# Patient Record
Sex: MALE | Race: WHITE | Employment: FULL TIME | URBAN - METROPOLITAN AREA
[De-identification: names, ages, dates, MRNs, and addresses within clinical notes are randomized per-mention and may not be internally consistent; named-entity substitution may affect disease eponyms.]

---
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
## 2024-01-02 ENCOUNTER — HOSPITAL ENCOUNTER (EMERGENCY)
Age: 24
Discharge: HOME | End: 2024-01-03
Payer: COMMERCIAL

## 2024-01-02 ENCOUNTER — APPOINTMENT (OUTPATIENT)
Dept: GENERAL RADIOLOGY | Age: 24
End: 2024-01-02
Payer: COMMERCIAL
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
## 2024-01-02 DIAGNOSIS — S51.011A ELBOW LACERATION, RIGHT, INITIAL ENCOUNTER: ICD-10-CM

## 2024-01-02 PROCEDURE — 73080 X-RAY EXAM OF ELBOW: CPT

## 2024-01-02 PROCEDURE — 12001 RPR S/N/AX/GEN/TRNK 2.5CM/<: CPT

## 2024-01-02 PROCEDURE — 99284 EMERGENCY DEPT VISIT MOD MDM: CPT

## 2024-01-03 VITALS
OXYGEN SATURATION: 95 % | HEART RATE: 83 BPM | RESPIRATION RATE: 16 BRPM | SYSTOLIC BLOOD PRESSURE: 128 MMHG | DIASTOLIC BLOOD PRESSURE: 93 MMHG | TEMPERATURE: 98.1 F

## 2024-01-03 PROCEDURE — 2500000003 HC RX 250 WO HCPCS: Performed by: PHYSICIAN ASSISTANT

## 2024-01-03 PROCEDURE — 6360000002 HC RX W HCPCS: Performed by: PHYSICIAN ASSISTANT

## 2024-01-03 PROCEDURE — 90471 IMMUNIZATION ADMIN: CPT | Performed by: PHYSICIAN ASSISTANT

## 2024-01-03 PROCEDURE — 90715 TDAP VACCINE 7 YRS/> IM: CPT | Performed by: PHYSICIAN ASSISTANT

## 2024-01-03 RX ORDER — LIDOCAINE HYDROCHLORIDE AND EPINEPHRINE 10; 10 MG/ML; UG/ML
20 INJECTION, SOLUTION INFILTRATION; PERINEURAL ONCE
Status: COMPLETED | OUTPATIENT
Start: 2024-01-03 | End: 2024-01-03

## 2024-01-03 RX ADMIN — TETANUS TOXOID, REDUCED DIPHTHERIA TOXOID AND ACELLULAR PERTUSSIS VACCINE, ADSORBED 0.5 ML: 5; 2.5; 8; 8; 2.5 SUSPENSION INTRAMUSCULAR at 00:51

## 2024-01-03 RX ADMIN — LIDOCAINE HYDROCHLORIDE,EPINEPHRINE BITARTRATE 20 ML: 10; .01 INJECTION, SOLUTION INFILTRATION; PERINEURAL at 00:51

## 2024-01-03 NOTE — DISCHARGE INSTRUCTIONS
1. Please call your primary care provider to schedule an appointment for reevaluation in 2-4 days for re-evaluation of your wound for any signs of infection or worsening. (If you are unable to follow up with your primary care provider, you can return to this emergency department.)  2. Unless we specified otherwise, your sutures should be removed from   joints 10-12 days; arms/legs 7-10 days.  You can do this at  occupational health, or your  family doctor, urgent care, or if needed return to this emergency department.  3. Return to Emergency Department with any drainage, discharge, pus, worsening redness, and swelling, worsening pain, difficulty moving the area around the wound, worsening symptoms or any new concerns.  4. Take good care of your wound to help healing and prevent infection: Keep the wound and bandaged for the next 24 hours.  After that you can bandage it daily to help keep it free from debris or contaminants.   Change the bandage daily or whenever it becomes wet or soiled. You can get the wound wet but do not soak or submerge the wound (avoid bathtubs, dishwater, hot tubs, swimming). Until the wound dries, if you want you can apply over the counter antibiotic ointment (e.g. bacitracin or Neosporin) if you are not allergic or have any sensitivities to this medications.  Avoid activities that put excessive force or tension on your wound, which may cause your wound to re-open.

## 2024-01-03 NOTE — ED NOTES
Pt presents to ED with complaints of left elbow pain states was at work and cut elbow on steel coil. Reports filing workers comp

## 2024-01-03 NOTE — ED NOTES
Called [employer] for workermens comp injury. [id number] states not currently on list for BAT, Drug screen

## 2024-01-04 NOTE — ED PROVIDER NOTES
**ADVANCED PRACTICE PROVIDER, I HAVE EVALUATED THIS PATIENT**        [facility] EMERGENCY DEPARTMENT  EMERGENCY DEPARTMENT ENCOUNTER      Pt Name: Sohail Flowers  MRN:[id number]  Birthdate 2000  Date of evaluation: 1/2/2024  Provider: Matty Benito PA-C      Chief Complaint:    Chief Complaint   Patient presents with    Laceration     Right elbow cut on steel coil          Nursing Notes, Past Medical Hx, Past Surgical Hx, Social Hx, Allergies, and Family Hx were all reviewed and agreed with or any disagreements were addressed in the HPI.    HISTORY OF PRESENT ILLNESS     History from : Patient    Limitations to history : None    Sohail Flowers is a 23 y.o. right-hand-dominant male who presents laceration to his right elbow.  This injury occurred while he was at work today.  Hide he describes accidentally striking his right elbow on sharp aspect of steel coil.  Mentions his boss sprayed copious amounts of antibiotic spray on it and applied a bandage which helped with the bleeding.  Bleeding was worse with movement of his elbow.  He denies any other injuries, difficulty bending or straightening his elbow, numbness tingling weakness, lightheadedness, recent illness    PastMedical/Surgical History:  No past medical history on file.  No past surgical history on file.    Medications:  There are no discharge medications for this patient.        Review of Systems:  (1 systems needed)  Pertinent positives and negatives are stated within HPI, all other systems reviewed and are negative.  Review of Systems   Constitutional:  Negative for fever.   Allergic/Immunologic: Negative for immunocompromised state.           Physical Exam:  Physical Exam  Constitutional:       General: alert and oriented, well appearing, no acute distress, Well-developed. Not toxic-appearing.   HENT:      Head: Normocephalic and atraumatic.     ENT: mucous membranes moist, voice normal in character, nose